# Patient Record
Sex: MALE | Race: OTHER | Employment: STUDENT | URBAN - METROPOLITAN AREA
[De-identification: names, ages, dates, MRNs, and addresses within clinical notes are randomized per-mention and may not be internally consistent; named-entity substitution may affect disease eponyms.]

---
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
## 2024-09-12 ENCOUNTER — APPOINTMENT (OUTPATIENT)
Dept: GENERAL RADIOLOGY | Age: 19
End: 2024-09-12
Attending: NURSE PRACTITIONER
Payer: COMMERCIAL

## 2024-09-12 ENCOUNTER — HOSPITAL ENCOUNTER (OUTPATIENT)
Age: 19
Discharge: HOME | End: 2024-09-12
Payer: COMMERCIAL

## 2024-09-12 VITALS
DIASTOLIC BLOOD PRESSURE: 76 MMHG | OXYGEN SATURATION: 100 % | SYSTOLIC BLOOD PRESSURE: 122 MMHG | HEART RATE: 60 BPM | RESPIRATION RATE: 20 BRPM | TEMPERATURE: 98 F

## 2024-09-12 DIAGNOSIS — S63.111A SUBLUXATION OF METACARPOPHALANGEAL JOINT OF RIGHT THUMB, INITIAL ENCOUNTER: Primary | ICD-10-CM

## 2024-09-12 PROCEDURE — 73140 X-RAY EXAM OF FINGER(S): CPT | Performed by: NURSE PRACTITIONER

## 2024-09-12 NOTE — ED PROVIDER NOTES
Patient Seen in: [facility]      History     Chief Complaint   Patient presents with    Finger Injury     Stated Complaint: right thumb injury    Subjective:   18 y/o male with unremarkable medical history presents with c./o pain, swelling to right thumb onset yesterday. States was  accidentally kicked when trying to help his girlfriend do a handstand. Thumb hyperextednded            Objective:   History reviewed. No pertinent past medical history.           History reviewed. No pertinent surgical history.             Social History     Socioeconomic History    Marital status: Single   Tobacco Use    Smoking status: Never    Smokeless tobacco: Never   Vaping Use    Vaping status: Never Used   Substance and Sexual Activity    Alcohol use: Never    Drug use: Never              Review of Systems   Musculoskeletal:  Positive for joint swelling.   Neurological:  Negative for numbness.   All other systems reviewed and are negative.      Positive for stated Chief Complaint: Finger Injury    Other systems are as noted in HPI.  Constitutional and vital signs reviewed.      All other systems reviewed and negative except as noted above.    Physical Exam     ED Triage Vitals [09/12/24 1032]   /76   Pulse 60   Resp 20   Temp 97.5 °F (36.4 °C)   Temp src Temporal   SpO2 100 %   O2 Device None (Room air)       Current Vitals:   Vital Signs  BP: 122/76  Pulse: 60  Resp: 20  Temp: 97.5 °F (36.4 °C)  Temp src: Temporal    Oxygen Therapy  SpO2: 100 %  O2 Device: None (Room air)            Physical Exam  Vitals and nursing note reviewed.   Constitutional:       General: He is not in acute distress.     Appearance: Normal appearance.   Musculoskeletal:         General: Swelling and tenderness present.      Right hand: Swelling, tenderness and bony tenderness present. No deformity. Normal sensation.        Hands:    Skin:     General: Skin is warm and dry.      Capillary Refill: Capillary refill takes less than 2  seconds.   Neurological:      Mental Status: He is alert and oriented to person, place, and time.   Psychiatric:         Behavior: Behavior is cooperative.               ED Course   Labs Reviewed - No data to display  XR FINGER(S) (MIN 2 VIEWS), RIGHT THUMB (CPT=73140)    Result Date: 9/12/2024  CONCLUSION:  1. Mild palmar subluxation proximal phalanx at the 1st MTP joint.  Recommend clinical correlation regarding ligamentous injury. 2. No underlying fracture.    Dictated by (CST): Justin Cartwright MD on 9/12/2024 at 11:26 AM     Finalized by (CST): Justin Cartwright MD on 9/12/2024 at 11:28 AM                          MDM                                         Medical Decision Making  Patient is well-appearing. In NAD  I discussed differentials with patient including but not limited to sprain vs fracture  Xray independently reviewed and discussed with patient, negative fractures. + subluxation  Patient has FROM of thumb. Small amount of traction applied to MCP joint  Spica postmold applied by Tech.  Neurovascularly intact  otc meds prn  Fu with hand specialist. Return/ ED precautions discussed      Problems Addressed:  Subluxation of metacarpophalangeal joint of right thumb, initial encounter: acute illness or injury    Amount and/or Complexity of Data Reviewed  Radiology: ordered. Decision-making details documented in ED Course.        Disposition and Plan     Clinical Impression:  1. Subluxation of metacarpophalangeal joint of right thumb, initial encounter         Disposition:  Discharge  9/12/2024 11:46 am    Follow-up:  Tato Garza MD  [address] [place] [id number]  [phone]    Schedule an appointment as soon as possible for a visit             Medications Prescribed:  There are no discharge medications for this patient.

## 2024-09-12 NOTE — ED INITIAL ASSESSMENT (HPI)
Pt here with right thumb injury , pt states pt was helping his girlfriend do a hand stand and got kicked in his right thumb, pt states his right thumb hyperextended, pt has some swelling an limited rom

## 2024-11-30 ENCOUNTER — LAB ENCOUNTER (OUTPATIENT)
Dept: LAB | Facility: HOSPITAL | Age: 19
End: 2024-11-30
Attending: FAMILY MEDICINE
Payer: COMMERCIAL

## 2024-11-30 PROCEDURE — 80061 LIPID PANEL: CPT | Performed by: FAMILY MEDICINE

## 2024-11-30 PROCEDURE — 36415 COLL VENOUS BLD VENIPUNCTURE: CPT | Performed by: FAMILY MEDICINE

## 2024-11-30 PROCEDURE — 85025 COMPLETE CBC W/AUTO DIFF WBC: CPT | Performed by: FAMILY MEDICINE

## 2024-11-30 PROCEDURE — 80053 COMPREHEN METABOLIC PANEL: CPT | Performed by: FAMILY MEDICINE

## 2025-02-25 ENCOUNTER — HOSPITAL ENCOUNTER (EMERGENCY)
Facility: HOSPITAL | Age: 20
Discharge: HOME | End: 2025-02-25
Attending: EMERGENCY MEDICINE
Payer: COMMERCIAL

## 2025-02-25 ENCOUNTER — APPOINTMENT (OUTPATIENT)
Dept: CT IMAGING | Facility: HOSPITAL | Age: 20
End: 2025-02-25
Attending: EMERGENCY MEDICINE
Payer: COMMERCIAL

## 2025-02-25 ENCOUNTER — APPOINTMENT (OUTPATIENT)
Dept: GENERAL RADIOLOGY | Facility: HOSPITAL | Age: 20
End: 2025-02-25
Attending: EMERGENCY MEDICINE
Payer: COMMERCIAL

## 2025-02-25 VITALS
TEMPERATURE: 99 F | OXYGEN SATURATION: 98 % | DIASTOLIC BLOOD PRESSURE: 75 MMHG | HEART RATE: 60 BPM | SYSTOLIC BLOOD PRESSURE: 130 MMHG | RESPIRATION RATE: 13 BRPM

## 2025-02-25 DIAGNOSIS — R07.9 RIGHT-SIDED CHEST PAIN: Primary | ICD-10-CM

## 2025-02-25 LAB
ALBUMIN SERPL-MCNC: 5.1 G/DL (ref 3.2–4.8)
ALP LIVER SERPL-CCNC: 67 U/L
ALT SERPL-CCNC: 7 U/L
ANION GAP SERPL CALC-SCNC: 8 MMOL/L (ref 0–18)
AST SERPL-CCNC: 24 U/L (ref ?–34)
BASOPHILS # BLD AUTO: 0.04 X10(3) UL (ref 0–0.2)
BASOPHILS NFR BLD AUTO: 0.5 %
BILIRUB DIRECT SERPL-MCNC: 0.1 MG/DL (ref ?–0.3)
BILIRUB SERPL-MCNC: 0.3 MG/DL (ref 0.3–1.2)
BUN BLD-MCNC: 16 MG/DL (ref 9–23)
BUN/CREAT SERPL: 13.7 (ref 10–20)
CALCIUM BLD-MCNC: 9.5 MG/DL (ref 8.7–10.4)
CHLORIDE SERPL-SCNC: 103 MMOL/L (ref 98–112)
CO2 SERPL-SCNC: 29 MMOL/L (ref 21–32)
CREAT BLD-MCNC: 1.17 MG/DL
D DIMER PPP FEU-MCNC: 0.55 UG/ML FEU (ref ?–0.5)
DEPRECATED RDW RBC AUTO: 38.5 FL (ref 35.1–46.3)
EGFRCR SERPLBLD CKD-EPI 2021: 91 ML/MIN/1.73M2 (ref 60–?)
EOSINOPHIL # BLD AUTO: 0.06 X10(3) UL (ref 0–0.7)
EOSINOPHIL NFR BLD AUTO: 0.8 %
ERYTHROCYTE [DISTWIDTH] IN BLOOD BY AUTOMATED COUNT: 12 % (ref 11–15)
GLUCOSE BLD-MCNC: 99 MG/DL (ref 70–99)
HCT VFR BLD AUTO: 42.8 %
HGB BLD-MCNC: 14.6 G/DL
IMM GRANULOCYTES # BLD AUTO: 0.02 X10(3) UL (ref 0–1)
IMM GRANULOCYTES NFR BLD: 0.3 %
INR BLD: 1.03 (ref 0.8–1.2)
LYMPHOCYTES # BLD AUTO: 2.84 X10(3) UL (ref 1–4)
LYMPHOCYTES NFR BLD AUTO: 37.7 %
MCH RBC QN AUTO: 29.7 PG (ref 26–34)
MCHC RBC AUTO-ENTMCNC: 34.1 G/DL (ref 31–37)
MCV RBC AUTO: 87 FL
MONOCYTES # BLD AUTO: 0.53 X10(3) UL (ref 0.1–1)
MONOCYTES NFR BLD AUTO: 7 %
NEUTROPHILS # BLD AUTO: 4.05 X10 (3) UL (ref 1.5–7.7)
NEUTROPHILS # BLD AUTO: 4.05 X10(3) UL (ref 1.5–7.7)
NEUTROPHILS NFR BLD AUTO: 53.7 %
OSMOLALITY SERPL CALC.SUM OF ELEC: 291 MOSM/KG (ref 275–295)
PLATELET # BLD AUTO: 266 10(3)UL (ref 150–450)
POTASSIUM SERPL-SCNC: 3.6 MMOL/L (ref 3.5–5.1)
PROT SERPL-MCNC: 7.5 G/DL (ref 5.7–8.2)
PROTHROMBIN TIME: 14.2 SECONDS (ref 11.6–14.8)
RBC # BLD AUTO: 4.92 X10(6)UL
SODIUM SERPL-SCNC: 140 MMOL/L (ref 136–145)
TROPONIN I SERPL HS-MCNC: 17 NG/L
WBC # BLD AUTO: 7.5 X10(3) UL (ref 4–11)

## 2025-02-25 PROCEDURE — 85610 PROTHROMBIN TIME: CPT | Performed by: EMERGENCY MEDICINE

## 2025-02-25 PROCEDURE — 85025 COMPLETE CBC W/AUTO DIFF WBC: CPT | Performed by: EMERGENCY MEDICINE

## 2025-02-25 PROCEDURE — 99285 EMERGENCY DEPT VISIT HI MDM: CPT

## 2025-02-25 PROCEDURE — 84484 ASSAY OF TROPONIN QUANT: CPT | Performed by: EMERGENCY MEDICINE

## 2025-02-25 PROCEDURE — 96374 THER/PROPH/DIAG INJ IV PUSH: CPT

## 2025-02-25 PROCEDURE — 85379 FIBRIN DEGRADATION QUANT: CPT | Performed by: EMERGENCY MEDICINE

## 2025-02-25 PROCEDURE — 93010 ELECTROCARDIOGRAM REPORT: CPT

## 2025-02-25 PROCEDURE — 80076 HEPATIC FUNCTION PANEL: CPT | Performed by: EMERGENCY MEDICINE

## 2025-02-25 PROCEDURE — 71045 X-RAY EXAM CHEST 1 VIEW: CPT | Performed by: EMERGENCY MEDICINE

## 2025-02-25 PROCEDURE — 93005 ELECTROCARDIOGRAM TRACING: CPT

## 2025-02-25 PROCEDURE — 71260 CT THORAX DX C+: CPT | Performed by: EMERGENCY MEDICINE

## 2025-02-25 PROCEDURE — 80048 BASIC METABOLIC PNL TOTAL CA: CPT | Performed by: EMERGENCY MEDICINE

## 2025-02-25 RX ORDER — KETOROLAC TROMETHAMINE 30 MG/ML
30 INJECTION, SOLUTION INTRAMUSCULAR; INTRAVENOUS ONCE
Status: COMPLETED | OUTPATIENT
Start: 2025-02-25 | End: 2025-02-25

## 2025-02-25 RX ORDER — IBUPROFEN 600 MG/1
600 TABLET, FILM COATED ORAL EVERY 6 HOURS PRN
Qty: 30 TABLET | Refills: 0 | Status: SHIPPED | OUTPATIENT
Start: 2025-02-25 | End: 2025-03-02

## 2025-02-26 LAB
ATRIAL RATE: 64 BPM
P AXIS: 43 DEGREES
P-R INTERVAL: 158 MS
Q-T INTERVAL: 414 MS
QRS DURATION: 80 MS
QTC CALCULATION (BEZET): 427 MS
R AXIS: 57 DEGREES
T AXIS: 31 DEGREES
VENTRICULAR RATE: 64 BPM

## 2025-02-26 NOTE — DISCHARGE INSTRUCTIONS
Return to emergency department for any worsening symptoms including but not limited to worsening chest pain, shortness of breath, worsening symptoms with exertion, lower extremity swelling, right upper quadrant abdominal pain, fevers of 100.4 weakness, numbness, abdominal pain, etc. Please follow with your primary care provider in the next few days for reevaluation of your symptoms.  Sometimes gallstones are not seen on CT scan.  If your symptoms persist, you may benefit from a right upper quadrant ultrasound to evaluate for gallstones  The Emergency Department is not intended to be a substitute for an effort to provide complete medical care. The imaging, if any, have often been interpreted on a preliminary basis pending final reading by the radiologist. If your blood pressure was greater than 140/90, please have this blood pressure rechecked by your primary care provider in the next several days.

## 2025-02-26 NOTE — ED PROVIDER NOTES
Patient Seen in: [facility]         EMERGENCY DEPARTMENT NOTE    Dictated. Voice Transcription software has been utilized for this dictation (the reader should be aware that typographical errors are possible with voice transcription software and to please contact the dictating physician if there are questions.)         History     Chief Complaint   Patient presents with    Chest Pain Angina       There may be discrepancies from triage note.     HPI    History provided by elvi and patient's fathert   19-year-old male, immunocompetent, no medical problems complaining of right lower chest pain for 2 days.  Pain is worse with deep inspiration.  No trauma.  Denies worsening symptoms with exertion.  No known history of ACS, PE, DVT.    Age < 50, HR< 100, o2 saturation  in room greater than 95%, no previous history of venous thromboembolism, no trauma/surgery within the last 4 weeks, no hemoptysis, no exogenous estrogen, no unilateral leg swellling. Perc score of 0  No fevers, chills, nausea, vomiting, diarrhea, constipation, cough, cold symptoms, urinary complaints.  No chest pain, shortness of breath  No headache, neck pain, neck stiffness, incontinence.  No changes in mentation, no changes in vision, no total/new extremity weakness, no total/new extremity paresthesia, no difficulty speaking.  No alleviating or exacerbating factors.  Denies orthopnea, pnd, change in exercise tolerance limited by chest pain/sob , lower extremity edema/asymmetry.   Reports good exercise tolerance.  Father at bedside states that his mother gave patient omeprazole without improvement of symptoms    History reviewed. History reviewed. No pertinent past medical history.    History reviewed. History reviewed. No pertinent surgical history.      Medications :  Prescriptions Prior to Admission[1]     History reviewed. No pertinent family history.    Smoking Status:   Social History     Socioeconomic History    Marital status: Single    Tobacco Use    Smoking status: Never    Smokeless tobacco: Never   Vaping Use    Vaping status: Never Used   Substance and Sexual Activity    Alcohol use: Never    Drug use: Never       Review of Systems   Constitutional: Negative.    HENT: Negative.     Eyes: Negative.    Respiratory: Negative.  Negative for shortness of breath.    Cardiovascular:  Positive for chest pain.   Gastrointestinal: Negative.  Negative for abdominal pain.   Genitourinary: Negative.    Musculoskeletal: Negative.    Skin: Negative.    Neurological: Negative.    Endo/Heme/Allergies: Negative.    Psychiatric/Behavioral: Negative.     All other systems reviewed and are negative.    Pertinent positives as listed.  All other organ systems are reviewed and are negative.    Constitutional and vital signs reviewed.      Social History and Family History elements reviewed from today, pertinent positives to the presenting problem noted.    Physical Exam     ED Triage Vitals [02/25/25 1927]   /77   Pulse 66   Resp 16   Temp 98.9 °F (37.2 °C)   Temp src Temporal   SpO2 97 %   O2 Device None (Room air)       All measures to prevent infection transmission during my interaction with the patient were taken. The patient was already wearing a droplet mask on my arrival to the room. Personal protective equipment including droplet mask, eye protection, and gloves were worn throughout the duration of the exam.  Handwashing was performed prior to and after the exam.  Stethoscope and any equipment used during my examination was cleaned with super sani-cloth germicidal wipes following the exam.     Physical Exam  Vitals and nursing note reviewed.   Constitutional:       General: He is not in acute distress.     Appearance: He is not ill-appearing or toxic-appearing.   Cardiovascular:      Rate and Rhythm: Normal rate and regular rhythm.      Comments: Dorsalis pedis pulses 2+ bilaterally    Pulmonary:      Effort: Pulmonary effort is normal. No respiratory  distress.      Breath sounds: No stridor. No wheezing, rhonchi or rales.   Chest:      Chest wall: No tenderness.   Abdominal:      General: There is no distension.      Palpations: Abdomen is soft.      Tenderness: There is no abdominal tenderness. There is no right CVA tenderness, left CVA tenderness, guarding or rebound.      Comments: Negative Canales sign, negative McBurney's point tenderness     Musculoskeletal:      Right lower leg: No edema.      Left lower leg: No edema.   Skin:     Capillary Refill: Capillary refill takes less than 2 seconds.   Neurological:      Mental Status: He is alert.      Comments: Gross motor and sensory function intact symmetrically and bilaterally to upper extremities and lower extremities.   Psychiatric:         Mood and Affect: Mood normal.         Behavior: Behavior normal.           Review of prior notes in Care everywhere/Epic performed by myself:  Patient was seen in the ER July 2020 for injury of shoulder    No abd imaging       ED Course     If labs obtained, they are personally reviewed by myself:     Labs Reviewed   D-DIMER - Abnormal; Notable for the following components:       Result Value    D-Dimer 0.55 (*)     All other components within normal limits   HEPATIC FUNCTION PANEL (7) - Abnormal; Notable for the following components:    ALT 7 (*)     Albumin 5.1 (*)     All other components within normal limits   BASIC METABOLIC PANEL (8) - Normal   TROPONIN I HIGH SENSITIVITY - Normal   PROTHROMBIN TIME (PT) - Normal   CBC WITH DIFFERENTIAL WITH PLATELET   RAINBOW DRAW LAVENDER   RAINBOW DRAW LIGHT GREEN   RAINBOW DRAW BLUE   RAINBOW DRAW GOLD       If radiologic studies ordered during today's ER visit, my independent interpretation are seen directly below.  This is awaiting the radiologist's final interpretation.  Chest X-ray, independent interpretation completed by myself and awaiting formal radiology read:  No acute cardiopulmonary process, no obvious mass   CTA  chest, independent interpretation of radiologic study completed by myself and awaiting formal radiologist interpretation: No pneumothorax      Imaging Results read by radiology in ED: CT CHEST PE AORTA (IV ONLY) (CPT=71260)    Result Date: 2/25/2025  CONCLUSION:   No pulmonary embolus  Mosaic attenuation of the lungs bilaterally suggestive of air trapping which can be seen with small vessel or small airways disease.  Bronchial wall thickening suggestive of mild chronic airway inflammation.   Normal gallbladder    Dictated by (CST): Olvin Templeton MD on 2/25/2025 at 9:08 PM     Finalized by (CST): Olvin Templeton MD on 2/25/2025 at 9:11 PM          XR CHEST AP PORTABLE  (CPT=71045)    Result Date: 2/25/2025  CONCLUSION: No acute cardiopulmonary abnormality.    Dictated by (CST): Olvin Templeton MD on 2/25/2025 at 8:23 PM     Finalized by (CST): Olvin Templeton MD on 2/25/2025 at 8:23 PM               ED Medications Administered:   Medications   iopamidol 76% (ISOVUE-370) injection for power injector (80 mL Intravenous Given 2/25/25 2059)   ketorolac (Toradol) 30 MG/ML injection 30 mg (30 mg Intravenous Given 2/25/25 2227)           Vitals:    02/25/25 1945 02/25/25 2200 02/25/25 2215 02/25/25 2230   BP: 130/75      Pulse: 60 66 66 60   Resp: 19 19 16 13   Temp:       TempSrc:       SpO2: 97% 97% 97% 98%     *I personally reviewed and interpreted all ED vitals.    Pulse Ox interpretation by myself: 97%, Room air, Normal     Monitor Interpretation by myself:   normal sinus rhythm    If Ekg obtained during today's visit, it is independently interpreted by myself directly below:  EKG    Rate, intervals and axes as noted on EKG Report.  Rate: 64  Rhythm: Sinus Rhythm  Reading: no st elevation, no st depression, normal t waves   , Sinus arrhythmia             Medical Record Review: I personally reviewed available prior medical records for any recent pertinent discharge summaries, testing, and procedures and reviewed those  reports.      [facility]     Medical decision making/ED Course:   19-year-old immunocompetent male complaining of right lower chest pain, and negative McBurney's point tenderness, negative Canales sign, extremely well-appearing.  He states that his pain is worse when he bends forward.  Musculoskeletal pathology versus biliary colic versus other.  Thankfully lab work today appears stable.     BMP normal, LFTs stable, nonelevated.  Negative Canales sign-acute cholecystitis considered and seems unlikely.  INR normal.  CBC normal.  Troponin negative, EKG nonischemic.  ACS considered and seems unlikely.  D-dimer slightly elevated at 0.55 therefore CTA of chest/abdomen obtained and thankfully negative for acute pathology.  I personally spoke with radiology technician who was scanned through gallbladder.  No signs of gallstones noted on CT scan    Patient is aware that radiopaque gallstones are not seen on CT scan today.  He is aware that if his symptoms persist he will benefit from right upper quadrant ultrasound.  He will contact his primary care provider for close follow-up.  NSAIDs recommended.  He requests prescription which will be sent to    ACS, dissection, PE, cardiac arrhythmia, pericardial effusion, CHF, Cholecystitis, AAA, pancreatitis, mesenteric ischemia, volvulus, bowel obstruction, appendicitis, among other life-threatening medical conditions considered and seems unlikely given patient's history, exam, and appearance.  Patient encouraged to follow-up with primary care provider in the next few days.  Advised to return to the emergency department for any worsening symptoms      Patient is non toxic appearing, is in no distress, hemodynamically stable.  Pt agrees and is aware of plan.         Differential Diagnosis:  as listed above in medical decision making.   *Please note that in the presenting to the emergency department, illness/injury that poses a threat to life or function is considered during this patient's  initial evaluation.    The complexity of this visit is therefore inherently more complex given the need to consider life threatening pathology prior to any other etiology for this patient's visit.    The differential diagnosis and medical decision above exemplify this rationale.       Medical Decision Making  Problems Addressed:  Right-sided chest pain: acute illness or injury    Amount and/or Complexity of Data Reviewed  Independent Historian: parent  External Data Reviewed: notes.  Labs: ordered. Decision-making details documented in ED Course.  Radiology: ordered and independent interpretation performed. Decision-making details documented in ED Course.  ECG/medicine tests: ordered and independent interpretation performed. Decision-making details documented in ED Course.    Risk  Prescription drug management.               Vitals:    02/25/25 1945 02/25/25 2200 02/25/25 2215 02/25/25 2230   BP: 130/75      Pulse: 60 66 66 60   Resp: 19 19 16 13   Temp:       TempSrc:       SpO2: 97% 97% 97% 98%             Complicating Factors: Significant medical problems that contribute to the complexity of this emergency room evaluation is listed above.    Condition upon leaving the department: Stable    Disposition and Plan     Clinical Impression:  1. Right-sided chest pain        Disposition:  Discharge    Medications Prescribed:  Discharge Medication List as of 2/25/2025 11:29 PM        START taking these medications    Details   ibuprofen 600 MG Oral Tab Take 1 tablet (600 mg total) by mouth every 6 (six) hours as needed for Pain (With food as needed for back pain)., Normal, Disp-30 tablet, R-0             I have discussed the discharge plan with the patient and/or family or well wisher present in the room with the patient's permission.  They state that they understand and agree with the plan.  All questions regarding their care have been answered prior to discharge.  They are aware: Emergency Department is not intended to  be a substitute for an effort to provide complete medical care. The imaging, if any, have often been interpreted on a preliminary basis pending final reading by the radiologist.  Instructed to return immediately to the ED if any changes or worsening of condition should occur.  If patient's blood pressure was greater than 140/90 today, patient encouraged to have this blood pressure rechecked with primary MD and blood pressure education provided.                         [1] (Not in a hospital admission)

## 2025-02-26 NOTE — ED INITIAL ASSESSMENT (HPI)
Pt c/o CP that started last night during dinner. Pt states that throughout the day today, pt has pain with inspiration.

## (undated) NOTE — LETTER
Date & Time: 9/12/2024, 12:10 PM  Patient: Santos Massey Jr.  Encounter Provider(s):    Jackie Mckeon APRN       To Whom It May Concern:    Santos Massey was seen and treated in our department on 9/12/2024. He should not return to work until 9/18/2024 .    If you have any questions or concerns, please do not hesitate to call.        _Jackie PETERS____________________________  Physician/APC Signature